# Patient Record
(demographics unavailable — no encounter records)

---

## 2024-10-29 NOTE — ASSESSMENT
[FreeTextEntry1] : JU TODD is a 61--year-old female who presented with obstructing right ureteral stone and sepsis s/p cystoscopy right ureteral stent insertion 09/21/22. Complicated by CVA, right hand ischemia s/p right radial artery cutdown and thrombectomy sp R URS/LL/ureteral stent exchange ureteral and renal stones fragmented. randals plaques seen. 12/16/22 Presents for evaluation of increasing nephrolithiasis seen on imaging June 2023, plan for shockwave lithotripsy however lost to follow-up, there is possible mild right hydronephrosis however CT urogram confirms no renal masses no upper tract filling defects and no hydronephrosis.  Bilateral nonobstructing nephrolithiasis. sp bilateral ureteroscopy laser lithotripsy ureteral stent with incision of right infundibular stenosis (stone seen within). majority randals plaques seen. ca ox appearing (10/25/2024), largest fragmentation occurred right kidney.   Stent Successfully removed today. Discussed with patient's aide dietary prevention and specifically focusing on stone prevention. There may have been insurance issues with nephrologist previously and therefore we have referred again to nephrology. Has hx hypocitraturia  - will obtain BMP, PTH + Uric acid serum - f/u 3 months for in office RBUS Diagnosis and management impacted by social determinants of health as patient is a poor historian

## 2024-10-29 NOTE — HISTORY OF PRESENT ILLNESS
[FreeTextEntry1] : JU TODD is a 61--year-old female who presented with obstructing right ureteral stone and sepsis s/p cystoscopy right ureteral stent insertion 09/21/22. Complicated by CVA, right hand ischemia s/p right radial artery cutdown and thrombectomy sp R URS/LL/ureteral stent exchange ureteral and renal stones fragmented. randals plaques seen. 12/16/22 Presents for evaluation of increasing nephrolithiasis seen on imaging June 2023, plan for shockwave lithotripsy however lost to follow-up, there is possible mild right hydronephrosis however CT urogram confirms no renal masses no upper tract filling defects and no hydronephrosis.  Bilateral nonobstructing nephrolithiasis. sp bilateral ureteroscopy laser lithotripsy ureteral stent with incision of right infundibular stenosis (stone seen within). majority randals plaques seen. ca ox appearing (10/25/2024).   Pt presents today for cystoscopy stent removal. Denies flank pain, gross hematuria, dysuria or associated symptoms.   previously CTU 06/27/2024 - images independently reviewed by me - On my read mild fullness of the right kidney however there is equal drainage of contrast bilaterally. no upper tract filling defects. No stones in ureter bilaterally. Non obstructing stones right greater than left. On bony window the LLP has approx. 7 mm conglomerate, largest 3-4 mm within the conglomerate. 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 No obstructing renal calculus or hydronephrosis.  Labs 03/14/2024 Cr 0.8 GFR 84 Ca 9.8 K 4.1  KUB x-ray, from 5/20/2024, demonstrates partially obscured kidneys.  Punctate right renal calcification measuring 4 mm.  Left kidney probably obscured by stool.  Agree with findings  Renal/bladder ultrasound from that time demonstrates mild right hydronephrosis/fullness.  Bilateral nonobstructing renal calculi, 5 mm in the right lower pole and 9 mm in the left lower pole.  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  litholink - 3/2023 - marked hypocitraturia, low urine volume, borderline high urine pH, High CaOx stone risk and mild CaP stone risk.  XR abd - 3/2023 - images visualized - Nephrolithiasis overlying bilateral renal shadows, as above. Indeterminate 0.5 cm round hyperdensity overlying left sacrum. Findings may reflect bone island, possibly phlebolith or ureteral calculus. rlo stone is closer to 4- 5mm.  CT A/P w/o IV contrast images [facility] radiology 02/21/23 - Bilateral non obstructing renal stones , no hydronephrosis. Indeterminate right hepatic lobe structure that could represent a cyst , but is difficult to characterize due to motion . Indeterminate perirectal/mesorectal space edema could reflect stigmata of recent proctitis.Correlation with symptomatology recommended . On my read , the right kidney has punctate small stones RLP largest 3 mm . Left kidney shows faint small lower pole stone burden mentioned as a branching 1.4 cm stone on the reports   todays stent was removed without issue , pt on abx  doing well no f/c. brother present to assist.  CT abdomen 09/21/22 images -  Delayed right nephrogram and mild right-sided hydronephrosis extending to the level of an obstructing 9 x 8 x 7 mm right UPJ calculus (1559HU.) Foci of associated cortical hypoenhancement are noted, raising the possibility superimposed infection.Several additional nonobstructing right renal calculi measure up to 5 mm right lower pole.Indeterminate 9 mm nodular density within the left breast, similar to that seen in August 2016; correlation with mammography is suggested.  Blood culture 09/22/22 - positive for E coli  urine culture 09/222/22  - >100,000 E coli  Urine culture 09/26/22 - no growth  blood culture 09/26/22 - no growth   Creatinine 09/26/22 - 1.1  GFR  09/26/22 - 68

## 2024-10-29 NOTE — ADDENDUM
[FreeTextEntry1] : Patient's note was transcribed with the assistance of a medical scribe under the supervision of Dr. Ramirez. I, Dr. Ramirez, have reviewed the patient's chart and agree that it aligns with my medical decisions. rTini Ash, our scribe, also served as a chaperone for physical examination purposes.  The submitted E/M billing level for this visit reflects the total time spent on the day of the visit including face-to-face time spent with the patient, non-face-to-face review of medical records and relevant information, documentation, and asynchronous communication with the patient after a visit via phone, email, or patients EHR portal after the visit.  The medical records reviewed are either scanned into the chart or reviewed with the patient using a patients electronic medical records portal for patients with records not available to [facility] via electronic transmission platforms from other institutions and labs.  Time spend counseling and performing coordination of care was also included in determining the appropriate EM billing level.

## 2024-11-08 NOTE — DISCUSSION/SUMMARY
[FreeTextEntry1] : Pt is a 60 yo F with PMHx of intellectual delay, HTN, who presents for stroke f/u. Pt had presented in 09/2022 with urosepsis, developed ischemic digits, bihemispheric ischemic strokes (first right parietal then L MCA), as well as right radial occlusion s/p thrombectomy.  # Bilateral ischemic stroke: in the setting of sepsis and multiple other embolic events. Etiology concerning for cardioembolic vs 2/2 hypercoagulable disorder. Repeat hypercoag unrevealing, however pt developed DVT when off AC. NIHSS 5, mRS 3 - Continue eliquis 5mg BID - Continue statin, goal LDL <70  # Seizure like activity: encephalopathy in the setting of stroke/sepsis with abnormal vEEG during hospitalization. Recent follow up 24hr amb EEG without epileptiform activity and/or seizure.  - Taper off LEV 250mg BID (250mg daily x 2 weeks, then discontinue). - rEEG in 3 mo - Seizure precautions x 3mo after tapering off AED    RTC in 6 mo.

## 2024-11-08 NOTE — HISTORY OF PRESENT ILLNESS
[FreeTextEntry1] : Interval History: Pt presents today with her brother for follow up. Endorses feeling well. No seizure like events. Denies any new weakness, numbness, imabalnce, headaches or speech difficulty.    HPI:  Pt is a 58 yo F with PMHx of intellectual delay, HTN, who presents for stroke f/u. Pt had presented in 09/2022 with urosepsis, developed ischemic digits, bihemispheric ischemic strokes (first right parietal then L MCA), as well as right radial occlusion s/p thrombectomy. Hypercoag profile at that time had shown elevated cardiolipin A IgM and beta glycoprotein, thus pt was started on anticoagulation. TTE showed EF <20% and negative bubble study. Pt's brother states that she has been improving with PT. She is able to walk for short distances with minimal assist, started going up/down stairs. Needs assistance with dressing, bathroom and bathing. Speech is improved, although notes she sleeps more than previous. Denies any new weakness, vision change, headache, dizziness or falls. She had a follow up with Hematology, repeat blood work showed no signs of APLS, initial elevation thought to be due to acute illness.   09/2023: Pt presents today with her brother in a wheelchair. They endorse doing well overall. She was transiently taken off eliquis a few months ago, developed DVT in RLE, and has since been deemed to require lifelong AC. She is also still on ASA. Denies any seizure-like activity since leaving the hospital. She is able walk with walker. Denies new weakness, numbness or speech difficulty. Recently had PEG tube removed, tolerating well.   04/2024: Pt presents today for follow up with her brother. endorses feeling well overall. denies headache, dizziness, new weakness or speech difficulty. Ambulating with a walker. No known seizure activity.

## 2024-11-08 NOTE — PHYSICAL EXAM
[General Appearance - Alert] : alert [General Appearance - In No Acute Distress] : in no acute distress [General Appearance - Well Nourished] : well nourished [General Appearance - Well Developed] : well developed [Oriented To Time, Place, And Person] : oriented to person, place, and time [Affect] : the affect was normal [Person] : oriented to person [Place] : oriented to place [Time] : oriented to time [Naming Objects] : no difficulty naming common objects [Repeating Phrases] : difficulty repeating a phrase [Fluency] : fluency intact [Comprehension] : comprehension intact [Cranial Nerves Optic (II)] : visual acuity intact bilaterally,  visual fields full to confrontation, pupils equal round and reactive to light [Cranial Nerves Oculomotor (III)] : extraocular motion intact [Cranial Nerves Trigeminal (V)] : facial sensation intact symmetrically [Cranial Nerves Facial (VII)] : face symmetrical [Cranial Nerves Vestibulocochlear (VIII)] : hearing was intact bilaterally [Cranial Nerves Hypoglossal (XII)] : there was no tongue deviation with protrusion [Motor Tone] : muscle tone was normal in all four extremities [Motor Strength] : muscle strength was normal in all four extremities [Sensation Tactile Decrease] : light touch was intact [Coordination - Dysmetria Impaired Finger-to-Nose Bilateral] : not present

## 2025-01-30 NOTE — ASSESSMENT
[FreeTextEntry1] : JU TODD is a 61--year-old female who presented with obstructing right ureteral stone and sepsis s/p cystoscopy right ureteral stent insertion 09/21/22. Complicated by CVA, right hand ischemia s/p right radial artery cutdown and thrombectomy sp R URS/LL/ureteral stent exchange ureteral and renal stones fragmented. randals plaques seen. 12/16/22 Presents for evaluation of increasing nephrolithiasis seen on imaging June 2023, plan for shockwave lithotripsy however lost to follow-up, there is possible mild right hydronephrosis however CT urogram confirms no renal masses no upper tract filling defects and no hydronephrosis.  Bilateral nonobstructing nephrolithiasis. sp bilateral ureteroscopy laser lithotripsy ureteral stent with incision of right infundibular stenosis (stone seen within). majority randals plaques seen. ca ox appearing (10/25/2024), largest fragmentation occurred right kidney.   Stable Hansel's plaques noted on sono Patient's brother was present today and all his questions were answered.  We went over the findings of the sonogram  - f/u with nephrology for stone prevention and chronic hypocitraturia - f/u 9 months with CT AP No Cont prior for reassessment of nephrolithiasis  Diagnosis and management impacted by social determinants of health as patient is a poor historian

## 2025-01-30 NOTE — ADDENDUM
[FreeTextEntry1] : Patient's note was transcribed with the assistance of a medical scribe under the supervision of Dr. Ramirez. I, Dr. Ramirez, have reviewed the patient's chart and agree that it aligns with my medical decisions. Trini Ash, our scribe, also served as a chaperone for physical examination purposes.  The submitted E/M billing level for this visit reflects the total time spent on the day of the visit including face-to-face time spent with the patient, non-face-to-face review of medical records and relevant information, documentation, and asynchronous communication with the patient after a visit via phone, email, or patients EHR portal after the visit.  The medical records reviewed are either scanned into the chart or reviewed with the patient using a patients electronic medical records portal for patients with records not available to [facility] via electronic transmission platforms from other institutions and labs.  Time spend counseling and performing coordination of care was also included in determining the appropriate EM billing level.

## 2025-01-30 NOTE — HISTORY OF PRESENT ILLNESS
[FreeTextEntry1] : JU TODD is a 61--year-old female who presented with obstructing right ureteral stone and sepsis s/p cystoscopy right ureteral stent insertion 09/21/22. Complicated by CVA, right hand ischemia s/p right radial artery cutdown and thrombectomy sp R URS/LL/ureteral stent exchange ureteral and renal stones fragmented. randals plaques seen. 12/16/22 Presents for evaluation of increasing nephrolithiasis seen on imaging June 2023, plan for shockwave lithotripsy however lost to follow-up, there is possible mild right hydronephrosis however CT urogram confirms no renal masses no upper tract filling defects and no hydronephrosis.  Bilateral nonobstructing nephrolithiasis. sp bilateral ureteroscopy laser lithotripsy ureteral stent with incision of right infundibular stenosis (stone seen within). majority randals plaques seen. ca ox appearing (10/25/2024), largest fragmentation occurred right kidney.   Pt is doing well. Denies flank pain, gross hematuria, dysuria or associated symptoms.   (In-Office) RBUS 01/30/2025 - images independently reviewed by me - On my read stable Hansel's plaques and no free-floating stones.  No hydronephrosis FINDINGS: Right Kidney seen with nonvascular cystic structure in lower pole measuring 1.2 x 1.0 x 1.0cm. Right Kidney also seen with hyperechoic foci, largest measuring 4mm with twinkle in lower pole. 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 Left Kidney also seen with hyperechoic focus in lower pole measuring 8mm with shadowing and twinkle. Both kidneys are normal in size and echogenicity without hydronephrosis. Only left ureteral jet visualized within 2 minute time period. Bladder wall appeared wnl. PVR 37.7 cc.   previously,  CTU 06/27/2024On my read mild fullness of the right kidney however there is equal drainage of contrast bilaterally. no upper tract filling defects. No stones in ureter bilaterally. Non obstructing stones right greater than left. On bony window the LLP has approx. 7 mm conglomerate, largest 3-4 mm within the conglomerate. 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 No obstructing renal calculus or hydronephrosis.  Labs 03/14/2024 Cr 0.8 GFR 84 Ca 9.8 K 4.1  KUB x-ray, from 5/20/2024, demonstrates partially obscured kidneys.  Punctate right renal calcification measuring 4 mm.  Left kidney probably obscured by stool.  Agree with findings  Renal/bladder ultrasound from that time demonstrates mild right hydronephrosis/fullness.  Bilateral nonobstructing renal calculi, 5 mm in the right lower pole and 9 mm in the left lower pole.  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  litholink - 3/2023 - marked hypocitraturia, low urine volume, borderline high urine pH, High CaOx stone risk and mild CaP stone risk.  XR abd - 3/2023 - images visualized - Nephrolithiasis overlying bilateral renal shadows, as above. Indeterminate 0.5 cm round hyperdensity overlying left sacrum. Findings may reflect bone island, possibly phlebolith or ureteral calculus. rlo stone is closer to 4- 5mm.  CT A/P w/o IV contrast images [facility] radiology 02/21/23 - Bilateral non obstructing renal stones , no hydronephrosis. Indeterminate right hepatic lobe structure that could represent a cyst , but is difficult to characterize due to motion . Indeterminate perirectal/mesorectal space edema could reflect stigmata of recent proctitis.Correlation with symptomatology recommended . On my read , the right kidney has punctate small stones RLP largest 3 mm . Left kidney shows faint small lower pole stone burden mentioned as a branching 1.4 cm stone on the reports   todays stent was removed without issue , pt on abx  doing well no f/c. brother present to assist.  CT abdomen 09/21/22 images -  Delayed right nephrogram and mild right-sided hydronephrosis extending to the level of an obstructing 9 x 8 x 7 mm right UPJ calculus (1559HU.) Foci of associated cortical hypoenhancement are noted, raising the possibility superimposed infection.Several additional nonobstructing right renal calculi measure up to 5 mm right lower pole.Indeterminate 9 mm nodular density within the left breast, similar to that seen in August 2016; correlation with mammography is suggested.  Blood culture 09/22/22 - positive for E coli  urine culture 09/222/22  - >100,000 E coli  Urine culture 09/26/22 - no growth  blood culture 09/26/22 - no growth   Creatinine 09/26/22 - 1.1  GFR  09/26/22 - 68

## 2025-02-20 NOTE — HISTORY OF PRESENT ILLNESS
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 Denies any new complaints. Reports she is feeling well. Patient underwent kidney stone removal without complications. Otherwise: (-) chest pain, (-) SOB.  Hematology: Dr. Stephens Neurology: Dr. Huggins Urology: Dr. Ramirez  Social History: (+) Resides in [facility].

## 2025-02-20 NOTE — ASSESSMENT
[FreeTextEntry1] : LE Edema: Venous duplex performed today revealed _. Rx provided for compression stockings.   DVT/CVA/ALI: The impression is hypercoagulable state. Continue Eliquis. Continue aspirin per neurology. Educated on signs/symptoms of bleeding.  HTN: The impression is hypertension. Currently, the condition is controlled. There are no changes in medication management. Continue current medical therapy. Other planned treatments include an exercise regimen, weight loss, low sodium diet, and alcohol moderation.  Instructed to follow up in _ Plan was discussed with the patient.

## 2025-02-20 NOTE — HISTORY OF PRESENT ILLNESS
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 Denies any new complaints. Reports she is feeling well. Patient underwent kidney stone removal without complications. Otherwise: (-) chest pain, (-) SOB.  Hematology: Dr. Stephens Neurology: Dr. Huggins Urology: Dr. Ramirez  Social History: (+) Resides in [facility].

## 2025-02-20 NOTE — DISCUSSION/SUMMARY
[FreeTextEntry1] : HTN: The impression is hypertension. Currently, the condition is controlled. There are no changes in medication management. Continue current medical therapy. Other planned treatments include an exercise regimen, weight loss, low sodium diet, and alcohol moderation.  H/O CVA/DVT: Currently, the condition is stable. There are no changes in medication management. Continue current medical therapy.  Obesity: Discussed risks of obesity with the patient/family. Counselled on weight loss with dietary modification and increased physical activity/exercise.  Instructed to follow up in 6 months.  Plan was discussed with the patient and family member.